# Patient Record
Sex: MALE | Race: WHITE | NOT HISPANIC OR LATINO | Employment: UNEMPLOYED | URBAN - METROPOLITAN AREA
[De-identification: names, ages, dates, MRNs, and addresses within clinical notes are randomized per-mention and may not be internally consistent; named-entity substitution may affect disease eponyms.]

---

## 2018-06-17 ENCOUNTER — HOSPITAL ENCOUNTER (EMERGENCY)
Facility: MEDICAL CENTER | Age: 43
End: 2018-06-17
Attending: EMERGENCY MEDICINE
Payer: MEDICAID

## 2018-06-17 VITALS
OXYGEN SATURATION: 98 % | DIASTOLIC BLOOD PRESSURE: 92 MMHG | HEIGHT: 72 IN | WEIGHT: 173.72 LBS | SYSTOLIC BLOOD PRESSURE: 143 MMHG | TEMPERATURE: 97.1 F | HEART RATE: 106 BPM | BODY MASS INDEX: 23.53 KG/M2 | RESPIRATION RATE: 18 BRPM

## 2018-06-17 DIAGNOSIS — M62.838 TRAPEZIUS MUSCLE SPASM: ICD-10-CM

## 2018-06-17 PROCEDURE — A9270 NON-COVERED ITEM OR SERVICE: HCPCS | Performed by: EMERGENCY MEDICINE

## 2018-06-17 PROCEDURE — 99283 EMERGENCY DEPT VISIT LOW MDM: CPT

## 2018-06-17 PROCEDURE — 700102 HCHG RX REV CODE 250 W/ 637 OVERRIDE(OP): Performed by: EMERGENCY MEDICINE

## 2018-06-17 RX ORDER — CYCLOBENZAPRINE HCL 10 MG
10 TABLET ORAL 3 TIMES DAILY PRN
Qty: 30 TAB | Refills: 0 | Status: SHIPPED | OUTPATIENT
Start: 2018-06-17 | End: 2019-10-03

## 2018-06-17 RX ORDER — GABAPENTIN 300 MG/1
300 CAPSULE ORAL 3 TIMES DAILY
Qty: 30 CAP | Refills: 0 | Status: SHIPPED | OUTPATIENT
Start: 2018-06-17 | End: 2019-10-03

## 2018-06-17 RX ORDER — IBUPROFEN 600 MG/1
600 TABLET ORAL ONCE
Status: COMPLETED | OUTPATIENT
Start: 2018-06-17 | End: 2018-06-17

## 2018-06-17 RX ADMIN — IBUPROFEN 600 MG: 600 TABLET, FILM COATED ORAL at 04:56

## 2018-06-17 ASSESSMENT — PAIN SCALES - GENERAL: PAINLEVEL_OUTOF10: 9
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
## 2019-10-03 ENCOUNTER — HOSPITAL ENCOUNTER (EMERGENCY)
Facility: MEDICAL CENTER | Age: 44
End: 2019-10-04
Attending: EMERGENCY MEDICINE
Payer: MEDICAID

## 2019-10-03 DIAGNOSIS — M25.532 LEFT WRIST PAIN: ICD-10-CM
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
## 2019-10-03 DIAGNOSIS — M79.602 LEFT ARM PAIN: ICD-10-CM
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
## 2019-10-03 DIAGNOSIS — M79.605 LEFT LEG PAIN: ICD-10-CM

## 2019-10-03 PROCEDURE — 99284 EMERGENCY DEPT VISIT MOD MDM: CPT

## 2019-10-04 ENCOUNTER — APPOINTMENT (OUTPATIENT)
Dept: RADIOLOGY | Facility: MEDICAL CENTER | Age: 44
End: 2019-10-04
Attending: EMERGENCY MEDICINE
Payer: MEDICAID

## 2019-10-04 VITALS
HEART RATE: 98 BPM | TEMPERATURE: 97.3 F | DIASTOLIC BLOOD PRESSURE: 78 MMHG | RESPIRATION RATE: 15 BRPM | HEIGHT: 72 IN | OXYGEN SATURATION: 98 % | SYSTOLIC BLOOD PRESSURE: 115 MMHG | WEIGHT: 174.38 LBS | BODY MASS INDEX: 23.62 KG/M2

## 2019-10-04 PROCEDURE — 29125 APPL SHORT ARM SPLINT STATIC: CPT

## 2019-10-04 PROCEDURE — 90471 IMMUNIZATION ADMIN: CPT

## 2019-10-04 PROCEDURE — 700111 HCHG RX REV CODE 636 W/ 250 OVERRIDE (IP): Performed by: EMERGENCY MEDICINE

## 2019-10-04 PROCEDURE — 73080 X-RAY EXAM OF ELBOW: CPT | Mod: LT

## 2019-10-04 PROCEDURE — 700102 HCHG RX REV CODE 250 W/ 637 OVERRIDE(OP): Performed by: EMERGENCY MEDICINE

## 2019-10-04 PROCEDURE — 73110 X-RAY EXAM OF WRIST: CPT | Mod: LT

## 2019-10-04 PROCEDURE — 90715 TDAP VACCINE 7 YRS/> IM: CPT | Performed by: EMERGENCY MEDICINE

## 2019-10-04 PROCEDURE — A9270 NON-COVERED ITEM OR SERVICE: HCPCS | Performed by: EMERGENCY MEDICINE

## 2019-10-04 PROCEDURE — 302874 HCHG BANDAGE ACE 2 OR 3""

## 2019-10-04 PROCEDURE — 73090 X-RAY EXAM OF FOREARM: CPT | Mod: LT

## 2019-10-04 RX ORDER — IBUPROFEN 600 MG/1
600 TABLET ORAL ONCE
Status: COMPLETED | OUTPATIENT
Start: 2019-10-04 | End: 2019-10-04

## 2019-10-04 RX ORDER — IBUPROFEN 600 MG/1
600 TABLET ORAL EVERY 6 HOURS PRN
Qty: 30 TAB | Refills: 0 | Status: SHIPPED | OUTPATIENT
Start: 2019-10-04 | End: 2020-03-09

## 2019-10-04 RX ADMIN — IBUPROFEN 600 MG: 600 TABLET ORAL at 00:15

## 2019-10-04 RX ADMIN — CLOSTRIDIUM TETANI TOXOID ANTIGEN (FORMALDEHYDE INACTIVATED), CORYNEBACTERIUM DIPHTHERIAE TOXOID ANTIGEN (FORMALDEHYDE INACTIVATED), BORDETELLA PERTUSSIS TOXOID ANTIGEN (GLUTARALDEHYDE INACTIVATED), BORDETELLA PERTUSSIS FILAMENTOUS HEMAGGLUTININ ANTIGEN (FORMALDEHYDE INACTIVATED), BORDETELLA PERTUSSIS PERTACTIN ANTIGEN, AND BORDETELLA PERTUSSIS FIMBRIAE 2/3 ANTIGEN 0.5 ML: 5; 2; 2.5; 5; 3; 5 INJECTION, SUSPENSION INTRAMUSCULAR at 00:15

## 2019-10-04 NOTE — ED NOTES
Pt given discharge and follow up instructions and prescriptions, all questions answered, pt verbalized understanding. Pt discharged with friend. Copy of discharge provided to pt. Signed copy in chart.  Pt states that all personal belongings are in possession. Pt off unit w/ steady gait.

## 2019-10-04 NOTE — ED NOTES
Pt ambulatory to hospital room w/ steady gait. Pt has swelling to L lateral wrist bone with an inch long abrasion to palm w/ bleeding controlled. CMS in tact. Agree w/ rest of triage note. Erp to see.

## 2019-10-04 NOTE — ED NOTES
Pt LUE placed in sugar tong splint. Pulses and capillary refill checked before and after application, <3 seconds. Pt tolerated well. Pt educated in use and care of splint. Pt verbalized understanding.   Pt arm placed in sling for comfort.
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
## 2019-10-04 NOTE — ED NOTES
Pt refuses US and would like to be discharged. Erp aware and informs he is okay to be discharged.

## 2019-11-23 ENCOUNTER — HOSPITAL ENCOUNTER (EMERGENCY)
Facility: MEDICAL CENTER | Age: 44
End: 2019-11-23
Attending: EMERGENCY MEDICINE
Payer: MEDICAID

## 2019-11-23 VITALS
RESPIRATION RATE: 16 BRPM | SYSTOLIC BLOOD PRESSURE: 123 MMHG | DIASTOLIC BLOOD PRESSURE: 87 MMHG | TEMPERATURE: 98 F | BODY MASS INDEX: 24.13 KG/M2 | HEIGHT: 72 IN | HEART RATE: 99 BPM | WEIGHT: 178.13 LBS | OXYGEN SATURATION: 98 %

## 2019-11-23 DIAGNOSIS — S09.90XA CLOSED HEAD INJURY, INITIAL ENCOUNTER: ICD-10-CM

## 2019-11-23 DIAGNOSIS — S01.01XA LACERATION OF SCALP, INITIAL ENCOUNTER: ICD-10-CM

## 2019-11-23 PROCEDURE — 700101 HCHG RX REV CODE 250: Performed by: EMERGENCY MEDICINE

## 2019-11-23 PROCEDURE — 304999 HCHG REPAIR-SIMPLE/INTERMED LEVEL 1

## 2019-11-23 PROCEDURE — 99283 EMERGENCY DEPT VISIT LOW MDM: CPT

## 2019-11-23 PROCEDURE — 304217 HCHG IRRIGATION SYSTEM

## 2019-11-23 PROCEDURE — 303747 HCHG EXTRA SUTURE

## 2019-11-23 RX ORDER — LIDOCAINE HYDROCHLORIDE AND EPINEPHRINE BITARTRATE 20; .01 MG/ML; MG/ML
10 INJECTION, SOLUTION SUBCUTANEOUS ONCE
Status: COMPLETED | OUTPATIENT
Start: 2019-11-23 | End: 2019-11-23

## 2019-11-23 RX ADMIN — LIDOCAINE HYDROCHLORIDE AND EPINEPHRINE 10 ML: 20; 10 INJECTION, SOLUTION INFILTRATION; PERINEURAL at 22:00

## 2019-11-23 SDOH — HEALTH STABILITY: MENTAL HEALTH: HOW OFTEN DO YOU HAVE A DRINK CONTAINING ALCOHOL?: MONTHLY OR LESS

## 2019-11-24 NOTE — ED NOTES
DC home with written and verbal instructions regarding f/u, activity and RX.  Verbalized understanding, ambulated out.

## 2019-11-24 NOTE — ED TRIAGE NOTES
"Jameel Chery  Chief Complaint   Patient presents with   • Head Injury     Pt states that he hit his head on a steel beam when he was trying to go under it. Pt has an appx 1\" laceration to his scalp on the right side of his head. Pt denies LOC. Pt denies N/V.  bleeding controlled with gauze and pressure.     Pt ambulatory to triage with above complaint.     /92   Pulse (!) 107   Temp 36.3 °C (97.4 °F) (Oral)   Resp 18   Ht 1.829 m (6')   Wt 80.8 kg (178 lb 2.1 oz)   SpO2 97%   BMI 24.16 kg/m²     Pt informed of triage process and encouraged to notify staff of any changes or concerns. Pt verbalized understanding of instructions. Apologized for long wait time. Pt placed back in lobby.     "
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
## 2019-12-07 ENCOUNTER — HOSPITAL ENCOUNTER (EMERGENCY)
Facility: MEDICAL CENTER | Age: 44
End: 2019-12-07
Payer: MEDICAID

## 2019-12-07 VITALS
HEART RATE: 115 BPM | OXYGEN SATURATION: 98 % | TEMPERATURE: 96.8 F | HEIGHT: 72 IN | DIASTOLIC BLOOD PRESSURE: 88 MMHG | SYSTOLIC BLOOD PRESSURE: 128 MMHG | BODY MASS INDEX: 24.58 KG/M2 | RESPIRATION RATE: 18 BRPM | WEIGHT: 181.44 LBS

## 2019-12-07 PROCEDURE — 99281 EMR DPT VST MAYX REQ PHY/QHP: CPT

## 2019-12-08 NOTE — ED TRIAGE NOTES
Chief Complaint   Patient presents with   • Suture Removal     top of head     4 stiches removed. Incision clean dry and intact.

## 2020-02-17 ENCOUNTER — APPOINTMENT (OUTPATIENT)
Dept: RADIOLOGY | Facility: MEDICAL CENTER | Age: 45
End: 2020-02-17
Attending: EMERGENCY MEDICINE
Payer: MEDICAID

## 2020-02-17 ENCOUNTER — HOSPITAL ENCOUNTER (EMERGENCY)
Facility: MEDICAL CENTER | Age: 45
End: 2020-02-17
Attending: EMERGENCY MEDICINE
Payer: MEDICAID

## 2020-02-17 VITALS
HEIGHT: 72 IN | BODY MASS INDEX: 23.77 KG/M2 | WEIGHT: 175.49 LBS | RESPIRATION RATE: 16 BRPM | HEART RATE: 98 BPM | SYSTOLIC BLOOD PRESSURE: 138 MMHG | OXYGEN SATURATION: 97 % | TEMPERATURE: 97.3 F | DIASTOLIC BLOOD PRESSURE: 96 MMHG

## 2020-02-17 DIAGNOSIS — M25.522 LEFT ELBOW PAIN: ICD-10-CM

## 2020-02-17 DIAGNOSIS — M71.22 BAKER CYST, LEFT: ICD-10-CM

## 2020-02-17 PROCEDURE — 99283 EMERGENCY DEPT VISIT LOW MDM: CPT

## 2020-02-17 PROCEDURE — 93971 EXTREMITY STUDY: CPT | Mod: LT

## 2020-02-17 PROCEDURE — 73080 X-RAY EXAM OF ELBOW: CPT | Mod: LT

## 2020-02-17 SDOH — HEALTH STABILITY: MENTAL HEALTH: HOW MANY STANDARD DRINKS CONTAINING ALCOHOL DO YOU HAVE ON A TYPICAL DAY?: 3 OR 4

## 2020-02-17 SDOH — HEALTH STABILITY: MENTAL HEALTH: HOW OFTEN DO YOU HAVE A DRINK CONTAINING ALCOHOL?: 2-3 TIMES A WEEK

## 2020-02-17 ASSESSMENT — LIFESTYLE VARIABLES: DO YOU DRINK ALCOHOL: NO

## 2020-02-17 NOTE — ED TRIAGE NOTES
Pt ambulated to triage.     Chief Complaint   Patient presents with   • Leg Pain     left leg pain, hx of blood clot that felt the same before   • Elbow Pain       Pt oriented to ed. Discussed wait times and to update staff with changes.
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
## 2020-03-09 ENCOUNTER — APPOINTMENT (OUTPATIENT)
Dept: RADIOLOGY | Facility: MEDICAL CENTER | Age: 45
End: 2020-03-09
Attending: EMERGENCY MEDICINE
Payer: MEDICAID

## 2020-03-09 ENCOUNTER — HOSPITAL ENCOUNTER (EMERGENCY)
Facility: MEDICAL CENTER | Age: 45
End: 2020-03-10
Attending: EMERGENCY MEDICINE
Payer: MEDICAID

## 2020-03-09 DIAGNOSIS — L08.9 WOUND INFECTION: ICD-10-CM

## 2020-03-09 DIAGNOSIS — T14.8XXA WOUND INFECTION: ICD-10-CM

## 2020-03-09 LAB
ALBUMIN SERPL BCP-MCNC: 4 G/DL (ref 3.2–4.9)
ALBUMIN/GLOB SERPL: 1.4 G/DL
ALP SERPL-CCNC: 40 U/L (ref 30–99)
ALT SERPL-CCNC: 14 U/L (ref 2–50)
ANION GAP SERPL CALC-SCNC: 9 MMOL/L (ref 0–11.9)
AST SERPL-CCNC: 18 U/L (ref 12–45)
BASOPHILS # BLD AUTO: 0.2 % (ref 0–1.8)
BASOPHILS # BLD: 0.03 K/UL (ref 0–0.12)
BILIRUB SERPL-MCNC: 0.4 MG/DL (ref 0.1–1.5)
BUN SERPL-MCNC: 17 MG/DL (ref 8–22)
CALCIUM SERPL-MCNC: 9.3 MG/DL (ref 8.5–10.5)
CHLORIDE SERPL-SCNC: 102 MMOL/L (ref 96–112)
CO2 SERPL-SCNC: 24 MMOL/L (ref 20–33)
CREAT SERPL-MCNC: 0.86 MG/DL (ref 0.5–1.4)
EOSINOPHIL # BLD AUTO: 0.1 K/UL (ref 0–0.51)
EOSINOPHIL NFR BLD: 0.7 % (ref 0–6.9)
ERYTHROCYTE [DISTWIDTH] IN BLOOD BY AUTOMATED COUNT: 42.5 FL (ref 35.9–50)
GLOBULIN SER CALC-MCNC: 2.9 G/DL (ref 1.9–3.5)
GLUCOSE SERPL-MCNC: 131 MG/DL (ref 65–99)
HCT VFR BLD AUTO: 42 % (ref 42–52)
HGB BLD-MCNC: 14.2 G/DL (ref 14–18)
IMM GRANULOCYTES # BLD AUTO: 0.07 K/UL (ref 0–0.11)
IMM GRANULOCYTES NFR BLD AUTO: 0.5 % (ref 0–0.9)
LACTATE BLD-SCNC: 1.7 MMOL/L (ref 0.5–2)
LYMPHOCYTES # BLD AUTO: 0.74 K/UL (ref 1–4.8)
LYMPHOCYTES NFR BLD: 5.4 % (ref 22–41)
MCH RBC QN AUTO: 30 PG (ref 27–33)
MCHC RBC AUTO-ENTMCNC: 33.8 G/DL (ref 33.7–35.3)
MCV RBC AUTO: 88.6 FL (ref 81.4–97.8)
MONOCYTES # BLD AUTO: 0.75 K/UL (ref 0–0.85)
MONOCYTES NFR BLD AUTO: 5.5 % (ref 0–13.4)
NEUTROPHILS # BLD AUTO: 11.99 K/UL (ref 1.82–7.42)
NEUTROPHILS NFR BLD: 87.7 % (ref 44–72)
NRBC # BLD AUTO: 0 K/UL
NRBC BLD-RTO: 0 /100 WBC
PLATELET # BLD AUTO: 263 K/UL (ref 164–446)
PMV BLD AUTO: 9 FL (ref 9–12.9)
POTASSIUM SERPL-SCNC: 4 MMOL/L (ref 3.6–5.5)
PROT SERPL-MCNC: 6.9 G/DL (ref 6–8.2)
RBC # BLD AUTO: 4.74 M/UL (ref 4.7–6.1)
SODIUM SERPL-SCNC: 135 MMOL/L (ref 135–145)
WBC # BLD AUTO: 13.7 K/UL (ref 4.8–10.8)

## 2020-03-09 PROCEDURE — 700111 HCHG RX REV CODE 636 W/ 250 OVERRIDE (IP): Performed by: EMERGENCY MEDICINE

## 2020-03-09 PROCEDURE — 700102 HCHG RX REV CODE 250 W/ 637 OVERRIDE(OP): Performed by: EMERGENCY MEDICINE

## 2020-03-09 PROCEDURE — 80053 COMPREHEN METABOLIC PANEL: CPT

## 2020-03-09 PROCEDURE — A9270 NON-COVERED ITEM OR SERVICE: HCPCS | Performed by: EMERGENCY MEDICINE

## 2020-03-09 PROCEDURE — 36415 COLL VENOUS BLD VENIPUNCTURE: CPT

## 2020-03-09 PROCEDURE — 700105 HCHG RX REV CODE 258: Performed by: EMERGENCY MEDICINE

## 2020-03-09 PROCEDURE — 99284 EMERGENCY DEPT VISIT MOD MDM: CPT

## 2020-03-09 PROCEDURE — 85025 COMPLETE CBC W/AUTO DIFF WBC: CPT

## 2020-03-09 PROCEDURE — 83605 ASSAY OF LACTIC ACID: CPT

## 2020-03-09 PROCEDURE — 96365 THER/PROPH/DIAG IV INF INIT: CPT

## 2020-03-09 PROCEDURE — 73590 X-RAY EXAM OF LOWER LEG: CPT | Mod: LT

## 2020-03-09 RX ORDER — ACETAMINOPHEN 325 MG/1
650 TABLET ORAL ONCE
Status: COMPLETED | OUTPATIENT
Start: 2020-03-09 | End: 2020-03-09

## 2020-03-09 RX ORDER — SODIUM CHLORIDE 9 MG/ML
1000 INJECTION, SOLUTION INTRAVENOUS ONCE
Status: COMPLETED | OUTPATIENT
Start: 2020-03-09 | End: 2020-03-10

## 2020-03-09 RX ORDER — AMOXICILLIN AND CLAVULANATE POTASSIUM 875; 125 MG/1; MG/1
1 TABLET, FILM COATED ORAL 2 TIMES DAILY
Qty: 14 TAB | Refills: 0 | Status: SHIPPED | OUTPATIENT
Start: 2020-03-09 | End: 2020-03-16

## 2020-03-09 RX ADMIN — ACETAMINOPHEN 650 MG: 325 TABLET, FILM COATED ORAL at 22:41

## 2020-03-09 RX ADMIN — SODIUM CHLORIDE 1000 ML: 9 INJECTION, SOLUTION INTRAVENOUS at 23:00

## 2020-03-09 RX ADMIN — AMPICILLIN SODIUM AND SULBACTAM SODIUM 3 G: 2; 1 INJECTION, POWDER, FOR SOLUTION INTRAMUSCULAR; INTRAVENOUS at 22:48

## 2020-03-09 ASSESSMENT — LIFESTYLE VARIABLES: DO YOU DRINK ALCOHOL: NO

## 2020-03-10 VITALS
RESPIRATION RATE: 18 BRPM | HEART RATE: 110 BPM | BODY MASS INDEX: 25.41 KG/M2 | SYSTOLIC BLOOD PRESSURE: 114 MMHG | DIASTOLIC BLOOD PRESSURE: 74 MMHG | OXYGEN SATURATION: 95 % | WEIGHT: 187.39 LBS | TEMPERATURE: 99.8 F

## 2020-03-10 NOTE — ED NOTES
Pt medicated with tyelnol, walked to restroom steady gait w/ stand-by assist, and IV antibiotics started. No other needs at this time.

## 2020-03-10 NOTE — ED TRIAGE NOTES
Chief Complaint   Patient presents with   • Open Wound     left leg, head and right hand     Pt wheeled to triage , c/o wounds on his head,hand and left leg x1wks.

## 2020-03-10 NOTE — ED PROVIDER NOTES
"ED Provider Note    Scribed for Dr. Jameel Moe M.D. by Rito Kaye. 3/9/2020  8:39 PM    Primary care provider: [facility] (Inactive)  Means of arrival: Walk-in  History obtained from: Patient  History limited by: None    CHIEF COMPLAINT  Chief Complaint   Patient presents with   • Open Wound     left leg, head and right hand       HPI  Jameel Chery Jr. is a 44 y.o. male who presents to the Emergency Department for an open wound on his left shin that began a few days ago and was described as a \"pus sac\". Patient adds that he has a separate wound on his head and a cut on his right hand that occurred 2 weeks ago. Patient is experiencing associated fevers. Patient notes that he has hepatitis C and is currently homeless. Patient notes no exacerbating or alleviating factors at this time.     REVIEW OF SYSTEMS  Pertinent positives include open wounds on head, leg and hand, fevers.   Pertinent negatives include none noted at this time.   As above, all other systems reviewed and are negative.   See HPI for further details.     PAST MEDICAL HISTORY   has a past medical history of Anxiety, Hepatitis, Hepatitis C, Hypertension, and Murmur.    SURGICAL HISTORY   has a past surgical history that includes other and other.    SOCIAL HISTORY  Social History     Tobacco Use   • Smoking status: Current Every Day Smoker     Packs/day: 1.00     Years: 21.00     Pack years: 21.00     Types: Cigarettes   • Smokeless tobacco: Current User   Substance Use Topics   • Alcohol use: Yes     Frequency: 2-3 times a week     Drinks per session: 3 or 4     Comment: occ   • Drug use: No     Comment:  hx meth, clean 3 yrs      Social History     Substance and Sexual Activity   Drug Use No    Comment:  hx meth, clean 3 yrs       FAMILY HISTORY  History reviewed. No pertinent family history.    CURRENT MEDICATIONS  Home Medications     Reviewed by Lizzie Sainz R.N. (Registered Nurse) on 03/09/20 at 1833  Med List Status: " Complete   Medication Last Dose Status        Patient Yaniv Taking any Medications                       ALLERGIES  Allergies   Allergen Reactions   • Tramadol Vomiting       PHYSICAL EXAM  VITAL SIGNS: /81   Pulse (!) 120   Temp 36.7 °C (98.1 °F) (Oral)   Resp 18   Wt 85 kg (187 lb 6.3 oz)   SpO2 97%   BMI 25.41 kg/m²     Constitutional: Well developed, Well nourished, mild distress, Non-toxic appearance.   HENT: Normocephalic, Atraumatic, Bilateral external ears normal, Oropharynx moist, No oral exudates.   Eyes: PERRLA, EOMI, Conjunctiva normal, No discharge.   Neck: No tenderness, Supple, No stridor.   Lymphatic: No lymphadenopathy noted.   Cardiovascular: Mildly tachycardic heart rate, Normal rhythm.   Thorax & Lungs: Clear to auscultation bilaterally, No respiratory distress, No wheezing, No crackles.   Abdomen: Soft, No tenderness, No masses, No pulsatile masses.   Skin: Open purulent ulcer on left lower leg with tenderness and redness extending a few cm around it, ulcer is 1 cm in diameter, nearly healed ulcer on right hand that doesn't appear infected, small ulcer on top of head with purulent drainage, Warm.  Extremities:, No edema No cyanosis.   Musculoskeletal: No tenderness to palpation or major deformities noted.  Intact distal pulses  Neurologic: Awake, alert. Moves all extremities spontaneously.  Psychiatric: Affect normal, Judgment normal, Mood normal.     LABS  Results for orders placed or performed during the hospital encounter of 03/09/20   CBC WITH DIFFERENTIAL   Result Value Ref Range    WBC 13.7 (H) 4.8 - 10.8 K/uL    RBC 4.74 4.70 - 6.10 M/uL    Hemoglobin 14.2 14.0 - 18.0 g/dL    Hematocrit 42.0 42.0 - 52.0 %    MCV 88.6 81.4 - 97.8 fL    MCH 30.0 27.0 - 33.0 pg    MCHC 33.8 33.7 - 35.3 g/dL    RDW 42.5 35.9 - 50.0 fL    Platelet Count 263 164 - 446 K/uL    MPV 9.0 9.0 - 12.9 fL    Neutrophils-Polys 87.70 (H) 44.00 - 72.00 %    Lymphocytes 5.40 (L) 22.00 - 41.00 %    Monocytes  5.50 0.00 - 13.40 %    Eosinophils 0.70 0.00 - 6.90 %    Basophils 0.20 0.00 - 1.80 %    Immature Granulocytes 0.50 0.00 - 0.90 %    Nucleated RBC 0.00 /100 WBC    Neutrophils (Absolute) 11.99 (H) 1.82 - 7.42 K/uL    Lymphs (Absolute) 0.74 (L) 1.00 - 4.80 K/uL    Monos (Absolute) 0.75 0.00 - 0.85 K/uL    Eos (Absolute) 0.10 0.00 - 0.51 K/uL    Baso (Absolute) 0.03 0.00 - 0.12 K/uL    Immature Granulocytes (abs) 0.07 0.00 - 0.11 K/uL    NRBC (Absolute) 0.00 K/uL   LACTIC ACID   Result Value Ref Range    Lactic Acid 1.7 0.5 - 2.0 mmol/L   COMP METABOLIC PANEL   Result Value Ref Range    Sodium 135 135 - 145 mmol/L    Potassium 4.0 3.6 - 5.5 mmol/L    Chloride 102 96 - 112 mmol/L    Co2 24 20 - 33 mmol/L    Anion Gap 9.0 0.0 - 11.9    Glucose 131 (H) 65 - 99 mg/dL    Bun 17 8 - 22 mg/dL    Creatinine 0.86 0.50 - 1.40 mg/dL    Calcium 9.3 8.5 - 10.5 mg/dL    AST(SGOT) 18 12 - 45 U/L    ALT(SGPT) 14 2 - 50 U/L    Alkaline Phosphatase 40 30 - 99 U/L    Total Bilirubin 0.4 0.1 - 1.5 mg/dL    Albumin 4.0 3.2 - 4.9 g/dL    Total Protein 6.9 6.0 - 8.2 g/dL    Globulin 2.9 1.9 - 3.5 g/dL    A-G Ratio 1.4 g/dL   ESTIMATED GFR   Result Value Ref Range    GFR If African American >60 >60 mL/min/1.73 m 2    GFR If Non African American >60 >60 mL/min/1.73 m 2       All labs reviewed by me.    RADIOLOGY  DX-TIBIA AND FIBULA LEFT   Final Result         1.  No acute traumatic bony injury.        The radiologist's interpretation of all radiological studies have been reviewed by me.    COURSE & MEDICAL DECISION MAKING  Pertinent Labs & Imaging studies reviewed. (See chart for details)    8:39 PM - Patient seen and examined at bedside. Ordered DX-tibia and fibula left, CBC with differential, estimated GFR, lactic acid and CMP to evaluate his symptoms. The differential diagnoses include but are not limited to: Osteomyelitis vs. Cellulitis vs. MRSA.     10:29 PM - Patient will be medicated with Unasyn 3 g for symptoms.    10:35 PM - Patient  was reevaluated at bedside. Discussed lab and radiology results with the patient and informed them that they were to be discharged. Patient's girlfriend verbalized her understanding and agreement with the plan of discharge as the patient was asleep at time of reevaluation. Patient will be medicated with Tylenol 650 mg prior to discharge.     Decision Making:  Patient with a wound infection likely an abscess is now drained.  He is mildly febrile white blood cell count only slightly elevated 13 5 lactate is normal with his fever coming down and hydration his heart rate is now down about 110 I think he be safely discharged.  I will put him on antibiotics after an IV dose here return if acutely worse high fever worsening pain    The patient will return for new or worsening symptoms and is stable at the time of discharge.    The patient is referred to a primary physician for blood pressure management, diabetic screening, and for all other preventative health concerns.    DISPOSITION:  Patient will be discharged home in stable condition.    FOLLOW UP:  No follow-up provider specified.    FINAL IMPRESSION  1. Wound infection          Rito HAIDER (Josep), am scribing for, and in the presence of, Jameel Moe M.D..    Electronically signed by: Rito Kaye (Josep), 3/9/2020    Jameel HAIDER M.D. personally performed the services described in this documentation, as scribed by Rito Kaye in my presence, and it is both accurate and complete. C    The note accurately reflects work and decisions made by me.  Jameel Moe M.D.  3/9/2020  11:47 PM

## 2020-10-31 PROCEDURE — 99282 EMERGENCY DEPT VISIT SF MDM: CPT

## 2020-10-31 ASSESSMENT — FIBROSIS 4 INDEX: FIB4 SCORE: 0.79

## 2020-11-01 ENCOUNTER — HOSPITAL ENCOUNTER (EMERGENCY)
Facility: MEDICAL CENTER | Age: 45
End: 2020-11-01
Attending: EMERGENCY MEDICINE
Payer: MEDICAID

## 2020-11-01 VITALS
HEART RATE: 90 BPM | HEIGHT: 72 IN | RESPIRATION RATE: 17 BRPM | DIASTOLIC BLOOD PRESSURE: 82 MMHG | WEIGHT: 163 LBS | BODY MASS INDEX: 22.08 KG/M2 | SYSTOLIC BLOOD PRESSURE: 138 MMHG | TEMPERATURE: 98.3 F | OXYGEN SATURATION: 97 %

## 2020-11-01 PROCEDURE — 700101 HCHG RX REV CODE 250

## 2020-11-01 PROCEDURE — 303977 HCHG I & D

## 2020-11-01 RX ORDER — LIDOCAINE HYDROCHLORIDE AND EPINEPHRINE BITARTRATE 20; .01 MG/ML; MG/ML
INJECTION, SOLUTION SUBCUTANEOUS
Status: COMPLETED
Start: 2020-11-01 | End: 2020-11-01

## 2020-11-01 RX ORDER — LIDOCAINE HYDROCHLORIDE AND EPINEPHRINE BITARTRATE 20; .01 MG/ML; MG/ML
10 INJECTION, SOLUTION SUBCUTANEOUS ONCE
Status: COMPLETED | OUTPATIENT
Start: 2020-11-01 | End: 2020-11-01

## 2020-11-01 RX ADMIN — LIDOCAINE HYDROCHLORIDE AND EPINEPHRINE BITARTRATE 10 ML: 20; .01 INJECTION, SOLUTION SUBCUTANEOUS at 01:15

## 2020-11-01 RX ADMIN — LIDOCAINE HYDROCHLORIDE AND EPINEPHRINE 10 ML: 20; 10 INJECTION, SOLUTION INFILTRATION; PERINEURAL at 01:15

## 2020-11-01 NOTE — ED NOTES
Pt laying on side, aox4, abscess noted to neck, hard and firm with redness. Pt moves all extremities, pulses intact to all extremities.

## 2020-11-01 NOTE — ED TRIAGE NOTES
Chief Complaint   Patient presents with   • Abscess     back of neck x 1 week.  reports fever/chills        Pt amb to triage with steady gait for above complaint. Reports noticed bump on back of neck about 1 week ago. Thought it was a pimple or ingrown hair at first. Continued to increase in size and increase pain.     Pt is alert and oriented, speaking in full sentences, follows commands and responds appropriately to questions. Resp are even and unlabored. No behavioral indicators of pain.   Pt placed in lobby. Pt educated on triage process. Pt encouraged to alert staff for any changes.    /84   Pulse (!) 109   Temp 36.7 °C (98 °F) (Temporal)   Resp 14   Ht 1.829 m (6')   Wt 73.9 kg (163 lb)   SpO2 97%   BMI 22.11 kg/m²

## 2020-11-01 NOTE — ED PROVIDER NOTES
ED Provider Note    CHIEF COMPLAINT  Chief Complaint   Patient presents with   • Abscess     back of neck x 1 week.  reports fever/chills        HPI  Jameel Chery Jr. is a 45 y.o. male who presents to the emergency department for abscess to the neck.  Past medical history as documented below.  Patient explains that a few days ago he noticed slight bump which he felt was an ingrown hair however the last 3 days is progressively worsening and more painful.  Significant other bedside concerned about abscess.  Patient denies prior history of the same.  No other lesions and no other complaints.    REVIEW OF SYSTEMS  See HPI for further details. All other systems are negative.     PAST MEDICAL HISTORY   has a past medical history of Anxiety, Hepatitis, Hepatitis C, Hypertension, and Murmur.    SOCIAL HISTORY  Social History     Tobacco Use   • Smoking status: Current Every Day Smoker     Packs/day: 1.00     Years: 21.00     Pack years: 21.00     Types: Cigarettes   • Smokeless tobacco: Current User   Substance and Sexual Activity   • Alcohol use: Yes     Frequency: 2-3 times a week     Drinks per session: 3 or 4     Comment: occ   • Drug use: No     Comment:  hx meth, clean 3 yrs   • Sexual activity: Not on file       SURGICAL HISTORY   has a past surgical history that includes other and other.    CURRENT MEDICATIONS  Home Medications     Reviewed by Jennifer Magana R.N. (Registered Nurse) on 10/31/20 at 2322  Med List Status: <None>   Medication Last Dose Status        Patient Yaniv Taking any Medications                       ALLERGIES  Allergies   Allergen Reactions   • Tramadol Vomiting       PHYSICAL EXAM  VITAL SIGNS: /84   Pulse (!) 109   Temp 36.7 °C (98 °F) (Temporal)   Resp 14   Ht 1.829 m (6')   Wt 73.9 kg (163 lb)   SpO2 97%   BMI 22.11 kg/m²  @[email or web address][[id number]::@  Pulse ox interpretation: I interpret this pulse ox as normal.  Constitutional: Alert in no apparent distress.  HENT: Normocephalic,  Atraumatic, Bilateral external ears normal. Nose normal.   Eyes: Pupils are equal and reactive. Conjunctiva normal  Neck: Supple with full range of motion no nuchal rigidity.  There is a 3 x 3 cm indurated lesion to the left mid posterior neck.  Mildly tender to palpation.  Mobile.  No fluctuance.  Central apical scabbing.  Heart: Regular rate and rythm, no murmurs.    Lungs: Clear to auscultation bilaterally.  Skin: Warm, Dry, No erythema, No rash.   Neurologic: Alert, Grossly non-focal.   Psychiatric: Affect normal, Judgment normal, Mood normal, Appears appropriate and not intoxicated.     Incision and drainage: Area prepped with chlorhexidine.  2% lidocaine with epinephrine injected into the apical aspect of the lesion.  #11 blade scalpel used for creation of a 1.5 cm linear incision.  Mildly loculation with forceps.  Minimal purulent discharge.  Patient tolerated well.  No complications.    COURSE & MEDICAL DECISION MAKING  Pertinent Labs & Imaging studies reviewed. (See chart for details)  Patient presented to the emergency department with the above complaint.  Please see his incision and drainage for procedure of abscess.  Patient started on antibiotics and discharged to home.   The patient will return for worsening symptoms and is stable at the time of discharge. The patient verbalizes understanding and will comply.    FINAL IMPRESSION  Posterior left neck abscess         Electronically signed by: Gray Moe M.D., 11/1/2020 3:53 AM

## 2020-11-01 NOTE — ED NOTES
Pt aox4, skin pink, warm and dry, airway patent, rr even and unlabored, nad noted. No new complaints. Dressing placed to neck. Pms intact to all extremities. VSS. Ambulates upon discharge without new incident or distress.

## 2020-11-13 ENCOUNTER — HOSPITAL ENCOUNTER (EMERGENCY)
Facility: MEDICAL CENTER | Age: 45
End: 2020-11-13
Attending: EMERGENCY MEDICINE
Payer: MEDICAID

## 2020-11-13 VITALS
DIASTOLIC BLOOD PRESSURE: 88 MMHG | OXYGEN SATURATION: 98 % | TEMPERATURE: 98.9 F | BODY MASS INDEX: 23.8 KG/M2 | SYSTOLIC BLOOD PRESSURE: 124 MMHG | WEIGHT: 175.71 LBS | HEIGHT: 72 IN | HEART RATE: 105 BPM | RESPIRATION RATE: 18 BRPM

## 2020-11-13 DIAGNOSIS — L03.221 CELLULITIS OF NECK: ICD-10-CM

## 2020-11-13 DIAGNOSIS — Z98.890 HISTORY OF INCISION AND DRAINAGE: ICD-10-CM

## 2020-11-13 DIAGNOSIS — Z51.89 ENCOUNTER FOR WOUND RE-CHECK: ICD-10-CM

## 2020-11-13 PROCEDURE — 99283 EMERGENCY DEPT VISIT LOW MDM: CPT

## 2020-11-13 PROCEDURE — 700102 HCHG RX REV CODE 250 W/ 637 OVERRIDE(OP): Performed by: EMERGENCY MEDICINE

## 2020-11-13 PROCEDURE — A9270 NON-COVERED ITEM OR SERVICE: HCPCS | Performed by: EMERGENCY MEDICINE

## 2020-11-13 RX ORDER — HYDROCODONE BITARTRATE AND ACETAMINOPHEN 5; 325 MG/1; MG/1
1 TABLET ORAL ONCE
Status: COMPLETED | OUTPATIENT
Start: 2020-11-13 | End: 2020-11-13

## 2020-11-13 RX ORDER — HYDROCODONE BITARTRATE AND ACETAMINOPHEN 5; 325 MG/1; MG/1
1 TABLET ORAL 2 TIMES DAILY PRN
Qty: 8 TAB | Refills: 0 | Status: SHIPPED | OUTPATIENT
Start: 2020-11-13 | End: 2020-11-18

## 2020-11-13 RX ORDER — CEPHALEXIN 500 MG/1
500 CAPSULE ORAL 4 TIMES DAILY
Qty: 20 CAP | Refills: 0 | Status: SHIPPED | OUTPATIENT
Start: 2020-11-13 | End: 2020-11-18

## 2020-11-13 RX ADMIN — HYDROCODONE BITARTRATE AND ACETAMINOPHEN 1 TABLET: 5; 325 TABLET ORAL at 06:37

## 2020-11-13 ASSESSMENT — ENCOUNTER SYMPTOMS
NECK PAIN: 1
FEVER: 0
COUGH: 0

## 2020-11-13 ASSESSMENT — FIBROSIS 4 INDEX: FIB4 SCORE: 0.79

## 2020-11-13 NOTE — ED TRIAGE NOTES
Chief Complaint   Patient presents with   • Wound Check     pt had neck abscess drained on 11/1 and is concerned about the healing process.    • Headache     Pt walked in for the above complaint. Pt noted to have slough in the wound, non-blanchable redness around the wound that is warm to the touch. Pt denies any fever or purulent drainage. Pt placed back in the lobby and educated to alert staff of any changes or concerns.     /87   Pulse (!) 114   Temp 37.2 °C (98.9 °F) (Oral)   Resp 20   Ht 1.829 m (6')   Wt 79.7 kg (175 lb 11.3 oz)   SpO2 96%   BMI 23.83 kg/m²

## 2020-11-13 NOTE — ED PROVIDER NOTES
ED Provider Note    ED Provider Note    Primary care provider: [facility] (Inactive)  Means of arrival: POV  History obtained from: patient  History limited by: None    CHIEF COMPLAINT  Chief Complaint   Patient presents with   • Wound Check     pt had neck abscess drained on 11/1 and is concerned about the healing process.    • Headache       HPI  Jameel Chery Jr. is a 45 y.o. male who presents to the Emergency Department with a chief complaint of the need for a wound check.  Patient was here on November 1 for an abscess due to an ingrown hair, in his posterior neck.  He underwent incision and drainage.  He has been on antibiotics, Keflex, taking his last dose yesterday.  He essentially comes because he wants to know if it is healing appropriately.  He has pain around the area of abscess but does not have a headache at this time.  He states he has been doing the best he can to take care of it despite being homeless.  He is requesting pain medication.  He states he otherwise feels well.  No systemic complaints.  No body aches or fever.    REVIEW OF SYSTEMS  Review of Systems   Constitutional: Negative for fever and malaise/fatigue.   HENT: Negative for congestion.    Respiratory: Negative for cough.    Musculoskeletal: Positive for neck pain.   All other systems reviewed and are negative.      PAST MEDICAL HISTORY   has a past medical history of Anxiety, Hepatitis, Hepatitis C, Hypertension, and Murmur.    SURGICAL HISTORY   has a past surgical history that includes other and other.    SOCIAL HISTORY  Social History     Tobacco Use   • Smoking status: Current Every Day Smoker     Packs/day: 1.00     Years: 21.00     Pack years: 21.00     Types: Cigarettes   • Smokeless tobacco: Current User   Substance Use Topics   • Alcohol use: Yes     Frequency: 2-3 times a week     Drinks per session: 3 or 4     Comment: occ   • Drug use: No     Comment:  hx meth, clean 3 yrs      Social History     Substance  and Sexual Activity   Drug Use No    Comment:  hx meth, clean 3 yrs       FAMILY HISTORY  History reviewed. No pertinent family history.    CURRENT MEDICATIONS  Home Medications    **Home medications have not yet been reviewed for this encounter**         ALLERGIES  Allergies   Allergen Reactions   • Tramadol Vomiting       PHYSICAL EXAM  VITAL SIGNS: /88   Pulse (!) 105   Temp 37.2 °C (98.9 °F) (Oral)   Resp 18   Ht 1.829 m (6')   Wt 79.7 kg (175 lb 11.3 oz)   SpO2 98%   BMI 23.83 kg/m²   Vitals reviewed.  Constitutional: Patient is oriented to person, place, and time. Appears well-developed and well-nourished. No distress.    Head: Normocephalic and atraumatic.   Eyes: Conjunctivae are normal.   Neck: Normal range of motion. Neck supple.  Posterior aspect of the neck, there is approximately 3 cm area of redness and induration in the center, is an approximately 1 cm ulceration.  No active drainage.  No fluctuance.  This appears to be healing abscess.  No meningeal signs.  Cardiovascular: Normal rate, regular rhythm and normal heart sounds.   Pulmonary/Chest: Effort normal and breath sounds normal. No respiratory distress.  Musculoskeletal: No edema   Neurological: No focal deficits.   Skin: Skin is warm and dry. No erythema. No pallor.   Psychiatric: Patient has a normal mood and affect.     COURSE & MEDICAL DECISION MAKING  Pertinent Labs & Imaging studies reviewed. (See chart for details)    Obtained and reviewed past medical records.  Patient's last encounter, was as the patient reported, November 1 he was seen for an abscess of his posterior neck.  Prior to that, patient was seen March of this year with wound infection of his hand.    5:41 AM - Patient seen and examined at bedside.  This is a pleasant 45-year-old male, status post incision and drainage of an abscess of his posterior neck.  This appears to be healing appropriately.  He does have some residual cellulitis surrounding the wound, there  is no fluctuance.  I do not see indication for further incision and drainage.  He will be placed on an additional 5 days of Keflex.  Wound is irrigated and dressed here in the ED.  He is encouraged to continue the care he has been undertaking with this wound has it does appear to be healing appropriately.  He is requesting pain medication.  He is given a dose of Norco here in the department and a prescription for a short course, number 8 tablets.   checked.  No prescription filling pattern of concern.  Patient is well-appearing and nontoxic with no systemic symptoms.  I do not feel further emergent intervention is indicated.  He is well-appearing and nontoxic.  He will be discharged home in stable condition.    FINAL IMPRESSION  1. Encounter for wound re-check    2. History of incision and drainage    3. Cellulitis of neck

## 2021-10-21 ENCOUNTER — OFFICE VISIT (OUTPATIENT)
Dept: URGENT CARE | Facility: CLINIC | Age: 46
End: 2021-10-21
Payer: MEDICAID

## 2021-10-21 VITALS
SYSTOLIC BLOOD PRESSURE: 126 MMHG | OXYGEN SATURATION: 98 % | RESPIRATION RATE: 16 BRPM | TEMPERATURE: 97.9 F | WEIGHT: 194 LBS | BODY MASS INDEX: 26.28 KG/M2 | HEIGHT: 72 IN | HEART RATE: 107 BPM | DIASTOLIC BLOOD PRESSURE: 66 MMHG

## 2021-10-21 DIAGNOSIS — L03.113 CELLULITIS OF RIGHT ARM: ICD-10-CM

## 2021-10-21 PROCEDURE — 99203 OFFICE O/P NEW LOW 30 MIN: CPT | Performed by: NURSE PRACTITIONER

## 2021-10-21 RX ORDER — CEPHALEXIN 500 MG/1
500 CAPSULE ORAL 3 TIMES DAILY
Qty: 30 CAPSULE | Refills: 0 | Status: SHIPPED | OUTPATIENT
Start: 2021-10-21 | End: 2021-10-31

## 2021-10-21 RX ORDER — SULFAMETHOXAZOLE AND TRIMETHOPRIM 800; 160 MG/1; MG/1
1 TABLET ORAL 2 TIMES DAILY
Qty: 20 TABLET | Refills: 0 | Status: SHIPPED | OUTPATIENT
Start: 2021-10-21 | End: 2021-10-31

## 2021-10-21 ASSESSMENT — FIBROSIS 4 INDEX: FIB4 SCORE: 0.8

## 2021-10-21 NOTE — PROGRESS NOTES
Subjective     Jameel Chery . is a 46 y.o. male who presents with Other (pt has open sores on both arms x 1 1/2 week )    Past Medical History:   Diagnosis Date   • Anxiety    • Hepatitis    • Hepatitis C    • Hypertension    • Murmur      Social History     Socioeconomic History   • Marital status:      Spouse name: Not on file   • Number of children: Not on file   • Years of education: Not on file   • Highest education level: Not on file   Occupational History   • Not on file   Tobacco Use   • Smoking status: Current Every Day Smoker     Packs/day: 1.00     Years: 21.00     Pack years: 21.00     Types: Cigarettes   • Smokeless tobacco: Current User   Vaping Use   • Vaping Use: Never used   Substance and Sexual Activity   • Alcohol use: Yes     Comment: occ   • Drug use: No     Comment:  hx meth, clean 3 yrs   • Sexual activity: Not on file   Other Topics Concern   • Not on file   Social History Narrative   • Not on file     Social Determinants of Health     Financial Resource Strain:    • Difficulty of Paying Living Expenses:    Food Insecurity:    • Worried About Running Out of Food in the Last Year:    • Ran Out of Food in the Last Year:    Transportation Needs:    • Lack of Transportation (Medical):    • Lack of Transportation (Non-Medical):    Physical Activity:    • Days of Exercise per Week:    • Minutes of Exercise per Session:    Stress:    • Feeling of Stress :    Social Connections:    • Frequency of Communication with Friends and Family:    • Frequency of Social Gatherings with Friends and Family:    • Attends [employer] Services:    • Active Member of Clubs or Organizations:    • Attends Club or Organization Meetings:    • Marital Status:    Intimate Partner Violence:    • Fear of Current or Ex-Partner:    • Emotionally Abused:    • Physically Abused:    • Sexually Abused:      No family history on file.    Allergies: Tramadol    Patient is a 46-year-old male who presents today with  complaint of several sores on the right arm.  Patient states these areas started as small pustules which his girlfriend tried to pop and force pus out of.  Patient states after she attempted to do this they became much worse.  Patient states he has several boil-like lesions over the arm starting from the elbow.  He states his girlfriend has continued to pick at them and they have become worse.          Other  This is a new problem. The current episode started 1 to 4 weeks ago. The problem occurs constantly. The problem has been gradually worsening. Nothing aggravates the symptoms. He has tried nothing for the symptoms. The treatment provided no relief.       Review of Systems   Skin:        Sores on both arms   All other systems reviewed and are negative.             Objective     /66 (BP Location: Left arm, Patient Position: Sitting, BP Cuff Size: Adult)   Pulse (!) 107   Temp 36.6 °C (97.9 °F) (Temporal)   Resp 16   Ht 1.829 m (6')   Wt 88 kg (194 lb)   SpO2 98%   BMI 26.31 kg/m²      Physical Exam  Vitals reviewed.   Constitutional:       Appearance: Normal appearance.   Musculoskeletal:        Arms:    Skin:     Capillary Refill: Capillary refill takes less than 2 seconds.   Neurological:      General: No focal deficit present.      Mental Status: He is alert and oriented to person, place, and time.   Psychiatric:         Mood and Affect: Mood normal.         Behavior: Behavior normal.                             Assessment & Plan   Cellulitis, right arm    Warm compresses  Topical Bactroban  Bactrim DS  Keflex  Return to urgent care or ER for increasing redness, swelling, pain, or fever.     There are no diagnoses linked to this encounter.

## 2021-11-08 ENCOUNTER — HOSPITAL ENCOUNTER (EMERGENCY)
Facility: MEDICAL CENTER | Age: 46
End: 2021-11-08
Attending: EMERGENCY MEDICINE
Payer: MEDICAID

## 2021-11-08 VITALS
TEMPERATURE: 97.8 F | OXYGEN SATURATION: 96 % | WEIGHT: 195.33 LBS | BODY MASS INDEX: 26.46 KG/M2 | DIASTOLIC BLOOD PRESSURE: 90 MMHG | SYSTOLIC BLOOD PRESSURE: 148 MMHG | RESPIRATION RATE: 16 BRPM | HEART RATE: 96 BPM | HEIGHT: 72 IN

## 2021-11-08 DIAGNOSIS — L02.91 ABSCESS: ICD-10-CM

## 2021-11-08 LAB
GRAM STN SPEC: NORMAL
SIGNIFICANT IND 70042: NORMAL
SITE SITE: NORMAL
SOURCE SOURCE: NORMAL

## 2021-11-08 PROCEDURE — 99283 EMERGENCY DEPT VISIT LOW MDM: CPT

## 2021-11-08 PROCEDURE — 87205 SMEAR GRAM STAIN: CPT

## 2021-11-08 PROCEDURE — 87186 SC STD MICRODIL/AGAR DIL: CPT

## 2021-11-08 PROCEDURE — 87070 CULTURE OTHR SPECIMN AEROBIC: CPT

## 2021-11-08 PROCEDURE — 87077 CULTURE AEROBIC IDENTIFY: CPT

## 2021-11-08 PROCEDURE — 700101 HCHG RX REV CODE 250: Performed by: EMERGENCY MEDICINE

## 2021-11-08 PROCEDURE — 303977 HCHG I & D

## 2021-11-08 PROCEDURE — A9270 NON-COVERED ITEM OR SERVICE: HCPCS | Performed by: EMERGENCY MEDICINE

## 2021-11-08 PROCEDURE — 700102 HCHG RX REV CODE 250 W/ 637 OVERRIDE(OP): Performed by: EMERGENCY MEDICINE

## 2021-11-08 RX ORDER — LIDOCAINE HYDROCHLORIDE AND EPINEPHRINE 10; 10 MG/ML; UG/ML
20 INJECTION, SOLUTION INFILTRATION; PERINEURAL ONCE
Status: COMPLETED | OUTPATIENT
Start: 2021-11-08 | End: 2021-11-08

## 2021-11-08 RX ORDER — HYDROCODONE BITARTRATE AND ACETAMINOPHEN 5; 325 MG/1; MG/1
1 TABLET ORAL ONCE
Status: COMPLETED | OUTPATIENT
Start: 2021-11-08 | End: 2021-11-08

## 2021-11-08 RX ORDER — BACITRACIN ZINC 500 [USP'U]/G
OINTMENT TOPICAL ONCE
Status: COMPLETED | OUTPATIENT
Start: 2021-11-08 | End: 2021-11-08

## 2021-11-08 RX ADMIN — BACITRACIN ZINC: 500 OINTMENT TOPICAL at 12:27

## 2021-11-08 RX ADMIN — LIDOCAINE HYDROCHLORIDE,EPINEPHRINE BITARTRATE 20 ML: 10; .01 INJECTION, SOLUTION INFILTRATION; PERINEURAL at 11:30

## 2021-11-08 RX ADMIN — HYDROCODONE BITARTRATE AND ACETAMINOPHEN 1 TABLET: 5; 325 TABLET ORAL at 12:20

## 2021-11-08 ASSESSMENT — FIBROSIS 4 INDEX: FIB4 SCORE: 0.8

## 2021-11-08 NOTE — ED NOTES
Bacitracin applied and pt's wound wrapped with gauze  Pt admits readiness for discharge.  Patient given discharge instructions and verbalized understanding.  Vital signs are stable.  Pt denies any further needs.

## 2021-11-08 NOTE — ED TRIAGE NOTES
45 y/o male ambulatory to triage for evaluation of   Chief Complaint   Patient presents with   • Wound Infection   • Abscess     multiple on the right arm     /95   Pulse (!) 110   Temp 36.1 °C (97 °F) (Temporal)   Resp 18   Ht 1.829 m (6')   Wt 88.6 kg (195 lb 5.2 oz)   SpO2 99%   BMI 26.49 kg/m²     Pt states he was seen at urgent care 2 weeks ago and prescribed abx, he states he has been taking as prescribed however there are new abscesses forming on the arm since.

## 2021-11-08 NOTE — ED PROVIDER NOTES
"ED Provider Note    CHIEF COMPLAINT  Chief Complaint   Patient presents with   • Wound Infection   • Abscess     multiple on the right arm       HPI  Jameel Chery Jr. is a 46 y.o. male here for evaluation of abscess.  Patient states that he has had the abscess for a few days, and is on his right forearm.  He has no fever chills or vomiting, he has no other issues at this time.  He has a history of abscess, and is recently been prescribed Bactrim and Keflex, however he was only taking the Keflex intermittently, and just \"found the Bactrim today.\"  He has no other medical concerns at this time.  He denies any IV drug use.    ROS;  Please see HPI  O/W negative     PAST MEDICAL HISTORY   has a past medical history of Anxiety, Hepatitis, Hepatitis C, Hypertension, and Murmur.    SOCIAL HISTORY  Social History     Tobacco Use   • Smoking status: Current Every Day Smoker     Packs/day: 1.00     Years: 21.00     Pack years: 21.00     Types: Cigarettes   • Smokeless tobacco: Current User   Vaping Use   • Vaping Use: Some days   • Substances: Flavoring   • Devices: Refillable tank   Substance and Sexual Activity   • Alcohol use: Yes     Comment: occ   • Drug use: No     Comment:  hx meth, clean 3 yrs   • Sexual activity: Not on file       SURGICAL HISTORY   has a past surgical history that includes other and other.    CURRENT MEDICATIONS  Home Medications     Reviewed by José Miguel Rayo R.N. (Registered Nurse) on 11/08/21 at 0813  Med List Status: Partial   Medication Last Dose Status        Patient Yaniv Taking any Medications                       ALLERGIES  Allergies   Allergen Reactions   • Tramadol Vomiting       REVIEW OF SYSTEMS  See HPI for further details. Review of systems as above, otherwise all other systems are negative.     PHYSICAL EXAM  VITAL SIGNS: /95   Pulse (!) 110   Temp 36.1 °C (97 °F) (Temporal)   Resp 18   Ht 1.829 m (6')   Wt 88.6 kg (195 lb 5.2 oz)   SpO2 99%   BMI 26.49 kg/m² "     Constitutional: Well developed, well nourished. No acute distress.  HEENT: Normocephalic, atraumatic. MMM  Neck: Supple, Full range of motion   Chest/Pulmonary:  No respiratory distress.  Equal expansion   Musculoskeletal: No deformity, no edema, neurovascular intact.   Neuro: Clear speech, appropriate, cooperative, cranial nerves II-XII grossly intact.  Psych: Normal mood and affect  Skin; right lateral forearm with 3 cm superficial area of induration with central fluctuance.  Mild erythema, mild tenderness palpation.  Neurovascular tact distally.  Second 2 mm area of induration, no fluctuance.  No tenderness.  Mild erythema.      PROCEDURES   Incision and Drainage Procedure  Done with resident; I was present for key components of the procedure.     Indication: Abscess    Location: right forearm.     Procedure: The patient was positioned appropriately and the skin over the incision site was prepped with betadine and draped in a sterile fashion. Local anesthesia was obtained with lidocaine with epi.  An incision was then made over the apex of the lesion and approximately 3 cc of purulent material was expressed. Loculations were broken up using forceps and more of the material was able to be expressed. The drainage cavity was then irrigated. The patient’s tetanus status was up to date and did not require a booster dose.  Full range of flexion extension post procedure.  Neurovascular tact distally.    The patient tolerated the procedure well.    Complications: None      MEDICAL RECORD  I have reviewed patient's medical record and pertinent results are listed.    COURSE & MEDICAL DECISION MAKING  I have reviewed any medical record information, laboratory studies and radiographic results as noted above.    12:10 PM  The patient will continue to take his Bactrim and Keflex, as he will has been noncompliant.  He will return for any further issues or concerns.    I you have had any blood pressure issues while here in  the emergency department, please see your doctor for a further evaluation or work up.    Differential diagnoses include but not limited to: abscess.     This patient presents with abscess .  At this time, I have counseled the patient/family regarding their medications, pain control, and follow up.  They will continue their medications, if any, as prescribed.  They will return immediately for any worsening symptoms and/or any other medical concerns.  They will see their doctor, or contact the doctor provided, in 1-2 days for follow up.       FINAL IMPRESSION  Abscess       Electronically signed by: Liam Strickland D.O., 11/8/2021 11:14 AM

## 2021-11-10 LAB
BACTERIA WND AEROBE CULT: ABNORMAL
BACTERIA WND AEROBE CULT: ABNORMAL
GRAM STN SPEC: ABNORMAL
SIGNIFICANT IND 70042: ABNORMAL
SITE SITE: ABNORMAL
SOURCE SOURCE: ABNORMAL

## 2021-11-10 RX ORDER — SULFAMETHOXAZOLE AND TRIMETHOPRIM 800; 160 MG/1; MG/1
1 TABLET ORAL 2 TIMES DAILY
Qty: 10 TABLET | Refills: 0 | Status: SHIPPED | OUTPATIENT
Start: 2021-11-10 | End: 2021-11-15

## 2021-11-10 NOTE — ED NOTES
ED Positive Culture Follow-up/Notification Note:    Date: 11/10/2021     Patient seen in the ED on 11/8/2021 for abscess evaluation. I and D was performed in the ER.      1. Abscess       There are no discharge medications for this patient.      Allergies: Tramadol     Vitals:    11/08/21 0800 11/08/21 0809 11/08/21 1228   BP: 151/95  148/90   Pulse: (!) 110  96   Resp: 18  16   Temp: 36.1 °C (97 °F)  36.6 °C (97.8 °F)   TempSrc: Temporal  Temporal   SpO2: 99%  96%   Weight:  88.6 kg (195 lb 5.2 oz)    Height: 1.829 m (6')         Final cultures:   Results     Procedure Component Value Units Date/Time    CULTURE WOUND W/ GRAM STAIN [[id number]]  (Abnormal)  (Susceptibility) Collected: 11/08/21 1221    Order Status: Completed Specimen: Wound from Abscess Updated: 11/10/21 0854     Significant Indicator POS     Source WND     Site Right Arm     Culture Result -     Gram Stain Result Few WBCs.  No organisms seen.       Culture Result Methicillin Resistant Staphylococcus aureus  Light growth      Susceptibility     Methicillin resistant staphylococcus aureus (1)     Antibiotic Interpretation Microscan   Method Status    Ampicillin  [*]  Resistant >8 mcg/mL XU Final    Amoxicillin/CA  [*]  Resistant >4/2 mcg/mL XU Final    Azithromycin Resistant >4 mcg/mL XU Final    Ceftriaxone  [*]  Resistant 16 mcg/mL XU Final    Clindamycin Sensitive <=0.25 mcg/mL XU Final    Cephalothin  [*]  Resistant <=8 mcg/mL XU Final    Cefoxitin Screen  [*]  Positive >4 mcg/mL XU Final    Cefazolin Resistant <=8 mcg/mL XU Final    Ciprofloxacin  [*]  Resistant >2 mcg/mL XU Final    Cefepime Resistant 16 mcg/mL XU Final    Ceftaroline Sensitive <=0.5 mcg/mL XU Final    Daptomycin Sensitive 1 mcg/mL XU Final    Ampicillin/sulbactam Resistant 16/8 mcg/mL XU Final    Erythromycin Resistant >4 mcg/mL XU Final    Vancomycin Sensitive 1 mcg/mL XU Final    Gentamicin  [*]  Sensitive <=4 mcg/mL XU Final    Inducible Clindamycin Test  [*]   Negative <=4/0.5 mcg/mL XU Final    Imipenem  [*]  Resistant <=4 mcg/mL XU Final    Levofloxacin  [*]  Intermediate 4 mcg/mL XU Final    Linezolid  [*]  Sensitive 2 mcg/mL XU Final    Meropenem  [*]  Resistant <=2 mcg/mL XU Final    Oxacillin Resistant >2 mcg/mL XU Final    Rifampin  [*]  Sensitive <=1 mcg/mL XU Final    Synercid  [*]  Sensitive <=0.5 mcg/mL XU Final    Trimeth/Sulfa Sensitive <=0.5/9.5 mcg/mL XU Final    Tetracycline Sensitive <=4 mcg/mL XU Final    Tigecycline  [*]  Sensitive <=0.25 mcg/mL XU Final           [*]  Suppressed Antibiotic                 GRAM STAIN [[id number]] Collected: 11/08/21 1221    Order Status: Completed Specimen: Wound Updated: 11/08/21 1632     Significant Indicator .     Source WND     Site Right Arm     Gram Stain Result Few WBCs.  No organisms seen.            Plan:   Patient was not discharged home on new prescriptions. He was instructed to keep taking cephalexin and Bactrim. Discussed culture results with patient and   reports that he only has prescription for cephalexin. He is unable to find prescription for Bactrim. Called in new prescription to [facility] [address].     New Rx:   Sulfamethoxazole-trimethoprim (BACTRIM DS) 800-160 MG tablet: 1 tab PO BID x 5 days #10 tabs, 0 refills per [facility] protocol       Emilia Lynn, PharmD  PGY2 Infectious Diseases Pharmacy Resident

## 2024-02-08 ENCOUNTER — APPOINTMENT (OUTPATIENT)
Dept: RADIOLOGY | Facility: MEDICAL CENTER | Age: 49
End: 2024-02-08
Attending: EMERGENCY MEDICINE
Payer: COMMERCIAL

## 2024-02-08 ENCOUNTER — HOSPITAL ENCOUNTER (EMERGENCY)
Facility: MEDICAL CENTER | Age: 49
End: 2024-02-08
Attending: EMERGENCY MEDICINE
Payer: COMMERCIAL

## 2024-02-08 VITALS
DIASTOLIC BLOOD PRESSURE: 80 MMHG | TEMPERATURE: 97.3 F | WEIGHT: 207.23 LBS | HEART RATE: 96 BPM | BODY MASS INDEX: 28.07 KG/M2 | OXYGEN SATURATION: 94 % | RESPIRATION RATE: 18 BRPM | HEIGHT: 72 IN | SYSTOLIC BLOOD PRESSURE: 124 MMHG

## 2024-02-08 DIAGNOSIS — S86.811A STRAIN OF CALF MUSCLE, RIGHT, INITIAL ENCOUNTER: ICD-10-CM

## 2024-02-08 PROCEDURE — 99283 EMERGENCY DEPT VISIT LOW MDM: CPT

## 2024-02-08 PROCEDURE — 93971 EXTREMITY STUDY: CPT | Mod: RT

## 2024-02-08 RX ORDER — IBUPROFEN 400 MG/1
400 TABLET ORAL EVERY 6 HOURS PRN
Qty: 30 TABLET | Refills: 0 | Status: SHIPPED | OUTPATIENT
Start: 2024-02-08

## 2024-02-08 ASSESSMENT — PAIN DESCRIPTION - PAIN TYPE
TYPE: ACUTE PAIN

## 2024-02-08 ASSESSMENT — LIFESTYLE VARIABLES: DO YOU DRINK ALCOHOL: NO

## 2024-02-08 NOTE — ED NOTES
Reports right calve x 3 days. Report hx of blood clots to left lower extremity x 2 years ago. Denies anti coag therapy. Pt aaox4. Awaiting ED MD assessment

## 2024-02-08 NOTE — ED NOTES
Provided crutches and education.    Reviewed discharge instructions, pt verbalized understanding of instructions and medication rx for ibuprofen.  Denies further questions at this time. Pt leaving ER in stable condition with belongings.

## 2024-02-08 NOTE — DISCHARGE INSTRUCTIONS
Your ultrasound was reassuring, you do not have a DVT.  You most likely have a mild strain of your calf.  Take it easy for the next few days, do not run or jump, you can take ibuprofen for pain.  Listen to her body, if you have significant pain with movement, rest.  Elevate the leg when possible

## 2024-02-08 NOTE — ED TRIAGE NOTES
Chief Complaint   Patient presents with    Leg Pain     Patient ambulates to triage reports pain to right lower leg similar to how a DVT felt to his left extremity two years ago. Reports pain with movement, symptoms started 3 days ago       BP (!) 149/107   Pulse (!) 117   Temp 36.2 °C (97.1 °F) (Temporal)   Resp 18   Ht 1.829 m (6')   Wt 94 kg (207 lb 3.7 oz)   SpO2 97%     Patient educated on ed triage process, instructed to notify staff of any new or worsening symptoms, verbalizes understanding. Patient returned to ed lobby, apologized for wait times.

## 2024-02-08 NOTE — ED PROVIDER NOTES
ED Provider Note    CHIEF COMPLAINT  Chief Complaint   Patient presents with    Leg Pain     Patient ambulates to triage reports pain to right lower leg similar to how a DVT felt to his left extremity two years ago. Reports pain with movement, symptoms started 3 days ago       EXTERNAL RECORDS REVIEWED  Ultrasound dated 2/17/2020, negative for DVT at that time.    HPI/ROS      Jameel Chery Jr. is a 48 y.o. male who presents with chief complaint of right lower leg pain.  Patient states pain feels very similar to prior DVT.  Patient states that he rides his bike to work and may have strained his calf during this yesterday.  He has had pain for approximately 18 hours.  He denies any significant chest pain or shortness of breath.  He was able to ride his bike without any significant decreased exertional tolerance.  Patient states that he worked all night, went to his son's house where he had a beer, he states that his son was using methamphetamine and thinks he may have have some of this in his system currently.  Patient denies any associated leg swelling.  He denies any fevers or chills.    PAST MEDICAL HISTORY   has a past medical history of Anxiety, Hepatitis, Hepatitis C, Hypertension, and Murmur.    SURGICAL HISTORY   has a past surgical history that includes other and other.    FAMILY HISTORY  No family history on file.    SOCIAL HISTORY  Social History     Tobacco Use    Smoking status: Every Day     Current packs/day: 1.00     Average packs/day: 1 pack/day for 21.0 years (21.0 ttl pk-yrs)     Types: Cigarettes    Smokeless tobacco: Current   Vaping Use    Vaping Use: Some days    Substances: Flavoring    Devices: Refillable tank   Substance and Sexual Activity    Alcohol use: Yes     Comment: occ    Drug use: No     Comment:  hx meth, clean 3 yrs    Sexual activity: Not on file       CURRENT MEDICATIONS  Home Medications       Reviewed by Kaylin Marr R.N. (Registered Nurse) on 02/08/24 at 0563   Med List Status: Not Addressed     Medication Last Dose Status        Patient Yaniv Taking any Medications                           ALLERGIES  Allergies   Allergen Reactions    Tramadol Vomiting       PHYSICAL EXAM  VITAL SIGNS: /83   Pulse 100   Temp 36.2 °C (97.1 °F) (Temporal)   Resp 20   Ht 1.829 m (6')   Wt 94 kg (207 lb 3.7 oz)   SpO2 95%   BMI 28.11 kg/m²    Physical Exam  Constitutional:       Appearance: Normal appearance.   HENT:      Mouth/Throat:      Mouth: Mucous membranes are moist.   Cardiovascular:      Rate and Rhythm: Normal rate and regular rhythm.      Comments: Rate of 95 on my exam  Pulmonary:      Effort: Pulmonary effort is normal. No respiratory distress.      Breath sounds: Normal breath sounds. No stridor.   Musculoskeletal:      Comments: Mild tenderness to palpation on palpation of patient's left calf.  No overlying skin changes.  No induration.  No associated erythema.  Distal pulses are 2+.  Sensation intact throughout.  Forage motion of knee and ankle without any pain above.   Neurological:      General: No focal deficit present.      Mental Status: He is alert and oriented to person, place, and time.   Psychiatric:         Mood and Affect: Mood normal.           DIAGNOSTIC STUDIES / PROCEDURES      RADIOLOGY  I have independently interpreted the diagnostic imaging associated with this visit and am waiting the final reading from the radiologist.   My preliminary interpretation is as follows: No evidence of DVT  Radiologist interpretation:   US-EXTREMITY VENOUS LOWER UNILAT RIGHT   Final Result            COURSE & MEDICAL DECISION MAKING        INITIAL ASSESSMENT, COURSE AND PLAN  Care Narrative: Very well-appearing patient here with some mild calf pain.  Will check ultrasound to evaluate for possible DVT.  Patient states he may have some methamphetamine in his system, this could explain his borderline tachycardia.  Patient denies any fevers or chills.  My suspicion of  infection in this patient especially given patient's exam is very low.      Ultrasound is unremarkable.  Patient with likely muscle strain.  Home with supportive care, NSAIDs.  Return precautions reviewed.  Tachycardia resolved.    DISPOSITION AND DISCUSSIONS      FINAL DIAGNOSIS  1. Strain of calf muscle, right, initial encounter